# Patient Record
Sex: FEMALE | Race: WHITE | NOT HISPANIC OR LATINO | Employment: OTHER | URBAN - METROPOLITAN AREA
[De-identification: names, ages, dates, MRNs, and addresses within clinical notes are randomized per-mention and may not be internally consistent; named-entity substitution may affect disease eponyms.]

---

## 2018-12-11 NOTE — PATIENT DISCUSSION
1.  Pseudophakia OU - IOLs stable. Monitor. 2. Dry Eyes OU:  Start artificials tears. Encouraged regular use. 3.  Pt c/o of grey spot covering the nasal inferior of her vision OD--POstive amsler OD. Refer to retina to leona4. 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 No rx changes. 6. Refer to retina for second opinion on Visual disturbance OD Inferior nasal.7.   RTN 12 mths CE

## 2019-11-19 NOTE — PATIENT DISCUSSION
1.  Primary open angle glaucoma OD mild: ---Cont with Travatan qhs OD-- Discussed importance of compliance. Will continue to monitor. 2.  ARMD OU dry -  Importance of smoking cessation blood pressure control and healthy diet were emphasized. In accordance with the AREDS study a good multivitamin containing EC and Zinc were recommened to be taken daily. Patient was instructed to self monitor their monocular vision (reading/Amsler Grid) at least weekly. Patient should immediately report any new onset of decreased vision or metamorphopsia. 3. Pseudophakia OU - IOLs stable. Monitor. 4. Dry Eyes OU:  Start artificials tears systane tid. Encouraged regular use. 5.  Pt c/o of grey spot covering the nasal inferior of her vision OD--POstive amsler OD. Refer to retina to brisaal6. Old PVD OU:  Patient was cautioned to call our office immediately if they experience a substantial change in their symptoms such as an increase in floaters persistent flashes loss of visual field 7. Rx changes. 8. Cont FU with DR Aaron Saul is following [facility]. 9.  RTN 12 mths CE/optos --FU on macula

## 2020-02-06 ENCOUNTER — NEW PATIENT COMPREHENSIVE (OUTPATIENT)
Dept: URBAN - METROPOLITAN AREA CLINIC 38 | Facility: CLINIC | Age: 65
End: 2020-02-06

## 2020-02-06 DIAGNOSIS — H52.02: ICD-10-CM

## 2020-02-06 DIAGNOSIS — H52.223: ICD-10-CM

## 2020-02-06 DIAGNOSIS — H52.4: ICD-10-CM

## 2020-02-06 PROCEDURE — 92004 COMPRE OPH EXAM NEW PT 1/>: CPT

## 2020-02-06 PROCEDURE — 92015 DETERMINE REFRACTIVE STATE: CPT

## 2020-02-06 ASSESSMENT — VISUAL ACUITY
OS_SC: 20/25
OS_CC: J2
OD_SC: 20/20
OD_CC: J1+
OS_SC: J6-
OD_SC: J1-

## 2020-02-06 ASSESSMENT — TONOMETRY
OS_IOP_MMHG: 15
OD_IOP_MMHG: 14

## 2020-09-02 NOTE — PATIENT DISCUSSION
1.  Primary open angle glaucoma OD mild: ---Cont with Travatan qhs OD-- Discussed importance of compliance. Will continue to monitor. 2.  ARMD OU dry -  Importance of smoking cessation blood pressure control and healthy diet were emphasized. In accordance with the AREDS study a good multivitamin containing EC and Zinc were recommened to be taken daily. Patient was instructed to self monitor their monocular vision (reading/Amsler Grid) at least weekly. Patient should immediately report any new onset of decreased vision or metamorphopsia. OPTOS3. Pseudophakia OU - IOLs stable. Monitor. 4. Dry Eyes OU:  Start artificials tears systane tid. Encouraged regular use. 5.  Pt c/o of grey spot covering the nasal inferior of her vision OD--POstive amsler OD.  stable for last 2 yrs. 6. Old PVD OU:  Patient was cautioned to call our office immediately if they experience a substantial change in their symptoms such as an increase in floaters persistent flashes loss of visual field 7. NO Rx changes. 8. Cont FU with DR Cordelia Rose is following [facility]. 9.  RTN 9/21 CE/optos --FU on macula

## 2021-02-10 ENCOUNTER — ESTABLISHED COMPREHENSIVE EXAM (OUTPATIENT)
Dept: URBAN - METROPOLITAN AREA CLINIC 38 | Facility: CLINIC | Age: 66
End: 2021-02-10

## 2021-02-10 DIAGNOSIS — H52.4: ICD-10-CM

## 2021-02-10 DIAGNOSIS — H52.03: ICD-10-CM

## 2021-02-10 DIAGNOSIS — H52.203: ICD-10-CM

## 2021-02-10 PROCEDURE — 92014 COMPRE OPH EXAM EST PT 1/>: CPT

## 2021-02-10 PROCEDURE — 92015 DETERMINE REFRACTIVE STATE: CPT

## 2021-02-10 ASSESSMENT — VISUAL ACUITY
OD_SC: J2
OS_SC: 20/30
OD_SC: 20/20-1
OS_SC: J7

## 2021-02-10 ASSESSMENT — TONOMETRY
OS_IOP_MMHG: 16
OD_IOP_MMHG: 18

## 2021-09-01 NOTE — PATIENT DISCUSSION
1.  Primary open angle glaucoma OD mild: ---Cont with Latanaprost qhs OD-- Discussed importance of compliance. Will continue to monitor. 2.  ARMD OU dry -  Importance of smoking cessation blood pressure control and healthy diet were emphasized. In accordance with the AREDS study a good multivitamin containing EC and Zinc were recommened to be taken daily. Patient was instructed to self monitor their monocular vision (reading/Amsler Grid) at least weekly. Patient should immediately report any new onset of decreased vision or metamorphopsia. OPTOS3. Pseudophakia OU - IOLs stable. Monitor. 4. Dry Eyes OU:  Start artificials tears systane tid. Encouraged regular use. 5.  Pt c/o of grey spot covering the nasal inferior of her vision OD--Postive amsler OD.  stable for last 2 yrs. 6. Old PVD OU:  Patient was cautioned to call our office immediately if they experience a substantial change in their symptoms such as an increase in floaters persistent flashes loss of visual field 7. NO Rx changes. 8. Cont FU with DR Chad Hagen is following [facility]? per pt.  9.  RTN 9/22  CE/OCT Nerve and Mac  --FU on macula and Nerve

## 2022-02-09 ENCOUNTER — COMPREHENSIVE EXAM (OUTPATIENT)
Dept: URBAN - METROPOLITAN AREA CLINIC 38 | Facility: CLINIC | Age: 67
End: 2022-02-09

## 2022-02-09 DIAGNOSIS — H25.813: ICD-10-CM

## 2022-02-09 DIAGNOSIS — H43.812: ICD-10-CM

## 2022-02-09 DIAGNOSIS — H52.4: ICD-10-CM

## 2022-02-09 DIAGNOSIS — H52.03: ICD-10-CM

## 2022-02-09 DIAGNOSIS — H04.123: ICD-10-CM

## 2022-02-09 DIAGNOSIS — H52.203: ICD-10-CM

## 2022-02-09 PROCEDURE — 92014 COMPRE OPH EXAM EST PT 1/>: CPT

## 2022-02-09 PROCEDURE — 92015 DETERMINE REFRACTIVE STATE: CPT

## 2022-02-09 ASSESSMENT — TONOMETRY
OS_IOP_MMHG: 20
OD_IOP_MMHG: 18

## 2022-02-09 ASSESSMENT — VISUAL ACUITY
OS_SC: 20/25-1
OU_SC: 20/20-
OS_SC: J12
OD_SC: 20/20
OU_SC: J6
OD_SC: J6

## 2022-08-02 NOTE — PATIENT DISCUSSION
Pt c/o of grey spot covering the nasal inferior of her vision OD--Feels it got worse--Postive amsler OD.

## 2024-04-03 ENCOUNTER — COMPREHENSIVE EXAM (OUTPATIENT)
Dept: URBAN - METROPOLITAN AREA CLINIC 38 | Facility: CLINIC | Age: 69
End: 2024-04-03

## 2024-04-03 DIAGNOSIS — H04.123: ICD-10-CM

## 2024-04-03 DIAGNOSIS — H52.203: ICD-10-CM

## 2024-04-03 DIAGNOSIS — H02.834: ICD-10-CM

## 2024-04-03 DIAGNOSIS — H52.03: ICD-10-CM

## 2024-04-03 DIAGNOSIS — H52.4: ICD-10-CM

## 2024-04-03 DIAGNOSIS — H43.812: ICD-10-CM

## 2024-04-03 DIAGNOSIS — H25.813: ICD-10-CM

## 2024-04-03 DIAGNOSIS — H02.831: ICD-10-CM

## 2024-04-03 PROCEDURE — 92015 DETERMINE REFRACTIVE STATE: CPT

## 2024-04-03 PROCEDURE — 92014 COMPRE OPH EXAM EST PT 1/>: CPT

## 2024-04-03 RX ORDER — OLOPATADINE HYDROCHLORIDE 2 MG/ML: 1 SOLUTION OPHTHALMIC ONCE A DAY

## 2024-04-03 ASSESSMENT — TONOMETRY
OD_IOP_MMHG: 16
OS_IOP_MMHG: 16

## 2024-04-03 ASSESSMENT — VISUAL ACUITY
OD_CC: J1
OS_CC: J4-
OS_SC: 20/30-1
OD_SC: 20/20-
OU_SC: 20/20-2

## 2024-04-15 ENCOUNTER — ESTABLISHED PATIENT (OUTPATIENT)
Dept: URBAN - METROPOLITAN AREA CLINIC 39 | Facility: CLINIC | Age: 69
End: 2024-04-15

## 2024-04-15 VITALS — BODY MASS INDEX: 28.13 KG/M2 | HEIGHT: 66 IN | WEIGHT: 175 LBS

## 2024-04-15 DIAGNOSIS — H02.835: ICD-10-CM

## 2024-04-15 DIAGNOSIS — H02.831: ICD-10-CM

## 2024-04-15 DIAGNOSIS — L98.8: ICD-10-CM

## 2024-04-15 DIAGNOSIS — H02.832: ICD-10-CM

## 2024-04-15 DIAGNOSIS — H02.834: ICD-10-CM

## 2024-04-15 DIAGNOSIS — H04.123: ICD-10-CM

## 2024-04-15 PROCEDURE — 92285 EXTERNAL OCULAR PHOTOGRAPHY: CPT

## 2024-04-15 PROCEDURE — 99214 OFFICE O/P EST MOD 30 MIN: CPT

## 2024-04-15 ASSESSMENT — VISUAL ACUITY
OD_SC: 20/20
OS_SC: 20/30-1

## 2024-04-16 ENCOUNTER — TECH ONLY (OUTPATIENT)
Dept: URBAN - METROPOLITAN AREA CLINIC 39 | Facility: CLINIC | Age: 69
End: 2024-04-16

## 2024-04-16 DIAGNOSIS — H02.835: ICD-10-CM

## 2024-04-16 DIAGNOSIS — H02.832: ICD-10-CM

## 2024-04-16 DIAGNOSIS — H02.831: ICD-10-CM

## 2024-04-16 DIAGNOSIS — H02.834: ICD-10-CM

## 2024-04-16 PROCEDURE — 92081 LIMITED VISUAL FIELD XM: CPT

## 2024-11-04 ENCOUNTER — CONSULTATION/EVALUATION (OUTPATIENT)
Dept: URBAN - METROPOLITAN AREA CLINIC 39 | Facility: CLINIC | Age: 69
End: 2024-11-04

## 2024-11-04 DIAGNOSIS — L98.8: ICD-10-CM

## 2024-11-04 DIAGNOSIS — H02.413: ICD-10-CM

## 2024-11-04 DIAGNOSIS — H02.832: ICD-10-CM

## 2024-11-04 DIAGNOSIS — H02.835: ICD-10-CM

## 2024-11-04 DIAGNOSIS — H04.123: ICD-10-CM

## 2024-11-04 PROCEDURE — 92285 EXTERNAL OCULAR PHOTOGRAPHY: CPT

## 2024-11-04 PROCEDURE — 99214 OFFICE O/P EST MOD 30 MIN: CPT

## 2024-11-06 ENCOUNTER — CONSULTATION/EVALUATION (OUTPATIENT)
Dept: URBAN - METROPOLITAN AREA CLINIC 39 | Facility: CLINIC | Age: 69
End: 2024-11-06

## 2024-11-06 DIAGNOSIS — H02.835: ICD-10-CM

## 2024-11-06 DIAGNOSIS — H02.832: ICD-10-CM

## 2024-11-06 PROCEDURE — 99499NC CONSULT, COSMETIC NO CHARGE

## 2024-11-25 ENCOUNTER — PREPPED CHART (OUTPATIENT)
Facility: LOCATION | Age: 69
End: 2024-11-25

## 2025-04-14 ENCOUNTER — PRE-OP/H&P (OUTPATIENT)
Age: 70
End: 2025-04-14

## 2025-04-14 ENCOUNTER — SURGERY/PROCEDURE (OUTPATIENT)
Age: 70
End: 2025-04-14

## 2025-04-14 DIAGNOSIS — H02.834: ICD-10-CM

## 2025-04-14 DIAGNOSIS — H02.831: ICD-10-CM

## 2025-04-14 PROCEDURE — 15823 BLEPHARP UPR EYELID XCSV SKN: CPT

## 2025-04-14 PROCEDURE — 99211T TECH SERVICE

## 2025-04-14 PROCEDURE — 15823F PER EYE FAT REMOVAL BILL W/ 15823

## 2025-04-23 ENCOUNTER — POST-OP (OUTPATIENT)
Age: 70
End: 2025-04-23

## 2025-04-23 DIAGNOSIS — Z98.890: ICD-10-CM

## 2025-04-23 PROCEDURE — 99024 POSTOP FOLLOW-UP VISIT: CPT
